# Patient Record
Sex: FEMALE | Race: WHITE | NOT HISPANIC OR LATINO | Employment: OTHER | URBAN - METROPOLITAN AREA
[De-identification: names, ages, dates, MRNs, and addresses within clinical notes are randomized per-mention and may not be internally consistent; named-entity substitution may affect disease eponyms.]

---

## 2017-09-20 PROBLEM — H66.92 OTITIS OF LEFT EAR: Status: ACTIVE | Noted: 2017-09-20

## 2017-09-20 PROBLEM — I44.7 LEFT BUNDLE BRANCH BLOCK (LBBB): Status: ACTIVE | Noted: 2017-09-20

## 2017-09-20 PROBLEM — R07.9 CHEST PAIN: Status: ACTIVE | Noted: 2017-09-20

## 2019-05-14 PROBLEM — S12.101D CLOSED NONDISPLACED FRACTURE OF SECOND CERVICAL VERTEBRA WITH ROUTINE HEALING: Status: ACTIVE | Noted: 2019-05-14

## 2019-11-14 PROBLEM — F41.9 ANXIETY: Status: ACTIVE | Noted: 2019-11-14

## 2019-11-14 PROBLEM — H66.92 OTITIS OF LEFT EAR: Status: RESOLVED | Noted: 2017-09-20 | Resolved: 2019-11-14

## 2019-11-14 PROBLEM — R07.9 CHEST PAIN: Status: RESOLVED | Noted: 2017-09-20 | Resolved: 2019-11-14

## 2021-06-09 PROBLEM — Z71.89 ACP (ADVANCE CARE PLANNING): Status: ACTIVE | Noted: 2021-06-09

## 2021-06-09 PROBLEM — R55 SYNCOPE: Status: ACTIVE | Noted: 2021-06-09

## 2022-08-08 PROBLEM — D35.2 PITUITARY MACROADENOMA: Status: ACTIVE | Noted: 2022-08-08

## 2022-08-08 PROBLEM — S14.109S: Status: ACTIVE | Noted: 2022-08-08

## 2023-01-04 ENCOUNTER — OFFICE VISIT (OUTPATIENT)
Dept: NEUROLOGY | Facility: CLINIC | Age: 78
End: 2023-01-04
Payer: MEDICARE

## 2023-01-04 ENCOUNTER — LAB VISIT (OUTPATIENT)
Dept: LAB | Facility: HOSPITAL | Age: 78
End: 2023-01-04
Payer: MEDICARE

## 2023-01-04 VITALS
SYSTOLIC BLOOD PRESSURE: 138 MMHG | DIASTOLIC BLOOD PRESSURE: 93 MMHG | WEIGHT: 142.88 LBS | HEART RATE: 77 BPM | RESPIRATION RATE: 18 BRPM | BODY MASS INDEX: 23.77 KG/M2

## 2023-01-04 DIAGNOSIS — D35.2 PITUITARY MACROADENOMA: ICD-10-CM

## 2023-01-04 DIAGNOSIS — R55 VASOVAGAL SYNCOPE: ICD-10-CM

## 2023-01-04 DIAGNOSIS — R55 SYNCOPE AND COLLAPSE: Primary | ICD-10-CM

## 2023-01-04 DIAGNOSIS — R42 DIZZINESS AND GIDDINESS: ICD-10-CM

## 2023-01-04 DIAGNOSIS — F41.9 ANXIETY: ICD-10-CM

## 2023-01-04 DIAGNOSIS — S14.109S INJURY OF CERVICAL SPINE, SEQUELA: ICD-10-CM

## 2023-01-04 DIAGNOSIS — R55 SYNCOPE AND COLLAPSE: ICD-10-CM

## 2023-01-04 LAB
CREAT SERPL-MCNC: 0.9 MG/DL (ref 0.5–1.4)
EST. GFR  (NO RACE VARIABLE): >60 ML/MIN/1.73 M^2

## 2023-01-04 PROCEDURE — 3080F DIAST BP >= 90 MM HG: CPT | Mod: CPTII,S$GLB,, | Performed by: NURSE PRACTITIONER

## 2023-01-04 PROCEDURE — 1126F PR PAIN SEVERITY QUANTIFIED, NO PAIN PRESENT: ICD-10-PCS | Mod: CPTII,S$GLB,, | Performed by: NURSE PRACTITIONER

## 2023-01-04 PROCEDURE — 99205 PR OFFICE/OUTPT VISIT, NEW, LEVL V, 60-74 MIN: ICD-10-PCS | Mod: S$GLB,,, | Performed by: NURSE PRACTITIONER

## 2023-01-04 PROCEDURE — 99999 PR PBB SHADOW E&M-EST. PATIENT-LVL IV: CPT | Mod: PBBFAC,,, | Performed by: NURSE PRACTITIONER

## 2023-01-04 PROCEDURE — 1159F MED LIST DOCD IN RCRD: CPT | Mod: CPTII,S$GLB,, | Performed by: NURSE PRACTITIONER

## 2023-01-04 PROCEDURE — 3075F SYST BP GE 130 - 139MM HG: CPT | Mod: CPTII,S$GLB,, | Performed by: NURSE PRACTITIONER

## 2023-01-04 PROCEDURE — 1126F AMNT PAIN NOTED NONE PRSNT: CPT | Mod: CPTII,S$GLB,, | Performed by: NURSE PRACTITIONER

## 2023-01-04 PROCEDURE — 3288F PR FALLS RISK ASSESSMENT DOCUMENTED: ICD-10-PCS | Mod: CPTII,S$GLB,, | Performed by: NURSE PRACTITIONER

## 2023-01-04 PROCEDURE — 1101F PR PT FALLS ASSESS DOC 0-1 FALLS W/OUT INJ PAST YR: ICD-10-PCS | Mod: CPTII,S$GLB,, | Performed by: NURSE PRACTITIONER

## 2023-01-04 PROCEDURE — 82565 ASSAY OF CREATININE: CPT | Performed by: NURSE PRACTITIONER

## 2023-01-04 PROCEDURE — 1101F PT FALLS ASSESS-DOCD LE1/YR: CPT | Mod: CPTII,S$GLB,, | Performed by: NURSE PRACTITIONER

## 2023-01-04 PROCEDURE — 99205 OFFICE O/P NEW HI 60 MIN: CPT | Mod: S$GLB,,, | Performed by: NURSE PRACTITIONER

## 2023-01-04 PROCEDURE — 3075F PR MOST RECENT SYSTOLIC BLOOD PRESS GE 130-139MM HG: ICD-10-PCS | Mod: CPTII,S$GLB,, | Performed by: NURSE PRACTITIONER

## 2023-01-04 PROCEDURE — 99999 PR PBB SHADOW E&M-EST. PATIENT-LVL IV: ICD-10-PCS | Mod: PBBFAC,,, | Performed by: NURSE PRACTITIONER

## 2023-01-04 PROCEDURE — 36415 COLL VENOUS BLD VENIPUNCTURE: CPT | Mod: PO | Performed by: NURSE PRACTITIONER

## 2023-01-04 PROCEDURE — 3288F FALL RISK ASSESSMENT DOCD: CPT | Mod: CPTII,S$GLB,, | Performed by: NURSE PRACTITIONER

## 2023-01-04 PROCEDURE — 1159F PR MEDICATION LIST DOCUMENTED IN MEDICAL RECORD: ICD-10-PCS | Mod: CPTII,S$GLB,, | Performed by: NURSE PRACTITIONER

## 2023-01-04 PROCEDURE — 3080F PR MOST RECENT DIASTOLIC BLOOD PRESSURE >= 90 MM HG: ICD-10-PCS | Mod: CPTII,S$GLB,, | Performed by: NURSE PRACTITIONER

## 2023-01-04 NOTE — ASSESSMENT & PLAN NOTE
Diagnostic testing to date reviewed:  + tilt test with drop in BP from 145/76 to 113/78 WITHOUT change in HR; pt reportedly asymptomatic throughout  30 day event monitor w/out significant arrhythmia, rather infrequent PVCs  MRI pituitary from 12/2021 with stability, no other intracranial abnormalities other than known adenoma  CTA H&N from 2018 with patent VB system, no significant LV disease   She is not orthostatic in clinic today    Continue conservative efforts to combat orthostasis   Given more recent spells described, will evaluate for possible epileptic events although suspicion for this is relatively low.   Update vascular and brain imaging. Routine EEG, followed by ambulatory 3 day EEG   No indication for AED at this time. Cautioned against driving until further evaluation of these spells.

## 2023-01-04 NOTE — PATIENT INSTRUCTIONS
Plan:  MRI of the brain  CTA (scan of the blood vessels in the head and neck) of the head and neck  EEG -- seizure test -- have to start with 30 min test, then will proceed with 2-3 day test    Continue hydration as we discussed to prevent drops in BP when you change position   - limit caffeine, as this can dehydrate you   - increase salt intake   - eating small, frequent meals throughout the day can also help with BP issie    - aside from water, can also try things like pedialyte, gatorade or body armour  Take your time changing positions    Follow up after testing complete -- around 8 weeks

## 2023-01-04 NOTE — PROGRESS NOTES
NEUROLOGY  Outpatient Consultation Visit     [facility]  [address] [facility] [address], [place] [address]  [phone] (office) / [phone] (fax)    Patient Name:  Lainey Blanco  :  1945  MR #:  [id number]  Acct #:  [id number]    Date of  Visit: 2023    Other Physicians:  Millie Camarena MD (Primary Care Physician)      CHIEF COMPLAINT: Dizziness and Loss of Consciousness (/)      HISTORY OF PRESENT ILLNESS 23:  Lainey Blanco is a 77 y.o. R-handed female seen in consultation for dizziness and syncope per Millie Camarena MD    Medical history is significant for non obstructive CAD, palpitations, pituitary adenoma, anxiety, HLD, osteoporosis     Onset:   ~3 years ago    Description:  Initial episode that occurred in restroom. She stood up from the toilet and passed out without warning. She was admitted to the ICU in [place] afterwards, sustained a C spine fracture. She was told that this was a vagal episode and also r/t dehydration.     Had similar episode in 2021, when she stood up from squatting and then woke up on the floor. Again, no prodrome. Was admitted to [facility] with this. She sustained injury to the L eye and had transient diplopia afterwards.     After 2nd episode, had a tilt test, stress test and event monitor per cardiology. The tilt test was positive, but she doesn't recall any specific interventions that were recommended. She has not been on any prescription Rx in several years. She feels that she is well hydrated; drinks 4-6 bottles of water per day.    Now very cautious to take her time with position change. Sometimes still has momentary dizziness with position change despite these measures.      She has a history of palpitations that feel like a fluttering sensation in her stomach. With this, she feels like she may faint. These have mostly resolved, though.     About 6 months ago, began to note a different sensation than episodes above. This sensation occurs  "without regard to position change (at rest, has awoken her from sleep). She feels it coming on, as if she is "entering another world" or "going into a black hole." Describes initial episode that occurred at home. She was standing, holding remote control and it fell to the floor. She was able to sit down.  came to check on her and she swore that she was talking to him, but he said that she was silent with a staring look on her face. Lasts for minutes. Has an episode every other day on average. She feels very tired after an episode, sometimes for the entire day.     Sensations have made her very anxious.     No HA, nausea, vomiting, vision change, paresthesia or focal weakness. Does feel like she has a sensation of pressure in her head after an episode.     Has known pituitary tumor and follows with endocrinology. This has been stable for years. Last imaging over 1 year ago.     Has chronic tinnitus and has seen ENT in the past. Diagnosed with BPPV years ago when living in [place], but this is different.     Doesn't sleep well. Up late and typically wakes ~ 1 hour after falling asleep. Takes ashwagandha to help with this. Has been on this for a couple of months. Episodes started prior to taking this.     Has never been evaluated by neurology. No personal or FH of seizures. No CNS infection. Not aware of any  complications.     Allergies:  Review of patient's allergies indicates:   Allergen Reactions    Darvon [propoxyphene] Hives       Current Medications:  Current Outpatient Medications   Medication Sig Dispense Refill    ASHWAGANDHA ROOT EXTRACT ORAL Take 1 tablet by mouth every evening.      b complex vitamins capsule Take 1 capsule by mouth once daily.      multivit-minerals/folic acid (WOMEN'S MULTIVITAMIN GUMMIES ORAL) Take by mouth.      niacinamide 500 mg Tab Take 1 tablet by mouth once daily.       No current facility-administered medications for this visit.       Past Medical History:  Past " Medical History:   Diagnosis Date    Arthritis     Dermatitis     Dyslipidemia     Epigastric pain     Headache     History of chicken pox     Osteoporosis        Past Surgical History:  Past Surgical History:   Procedure Laterality Date    basal cell carcinoma removal  06/2018    from forehead-pat reported    CATARACT EXTRACTION      right 5- 2015, left 07/15/2015    COLONOSCOPY  08/16/2021    Dr. Hamilton    ESOPHAGOGASTRODUODENOSCOPY  08/12/2014    OTHER SURGICAL HISTORY      1.   Elevate anterior repair. 2.   Transobturator suburethral sling placement.    steroid injection      left hip-pat reported    TUBAL LIGATION      UPPER GI ENDOSCOPY  10/09/2017       Family History:  family history includes Asthma in her mother; Breast cancer in her maternal aunt; Cancer in her father and mother; Diabetes in her maternal aunt and maternal grandmother; Heart attack in her maternal grandfather; Heart failure in her maternal grandfather; Other in her maternal grandfather.    Social History:   reports that she has never smoked. She has never used smokeless tobacco. She reports current alcohol use. She reports that she does not use drugs.      REVIEW OF SYSTEMS:  As per HPI    PHYSICAL EXAM:  BP (!) 138/93 (BP Location: Right arm, Patient Position: Standing, BP Method: Medium (Automatic))   Pulse 77   Resp 18   Wt 64.8 kg (142 lb 13.7 oz)   BMI 23.77 kg/m²     General: Well groomed. No acute distress.  Cardiovascular: Regular rate and rhythm.   Pulmonary: Normal effort and rate.   Musculoskeletal: No obvious joint deformities, moves all extremities well.  Extremities: No clubbing, cyanosis or edema.     Neurological exam:  Mental status: Awake and alert.  Oriented to person, place, time and situation. Recent and remote memory appear to be intact.  Fund of knowledge normal. Normal attention and concentration.   Speech/Language: Fluent and appropriate. No dysarthria or aphasia on conversation. Able to follow complex  "commands.   Cranial nerves (II-XII): Visual fields full. Pupils equal round and reactive to light, extraocular movements intact, no ptosis, no nystagmus. Facial sensation and symmetry intact bilaterally. Hearing grossly intact. Palate mobile and midline. Shoulder shrug normal bilaterally. Normal tongue protrusion.   Motor: 5 out of 5 strength throughout the upper and lower extremities bilaterally. Normal bulk and tone. No abnormal movements noted. No drift appreciated.   Sensation: Intact to light touch, vibration and temperature bilaterally.   DTR: 2+ at the knees and biceps bilaterally.  Coordination: Finger-nose-finger testing intact bilaterally. No tremor. Romberg absent.   Gait: Normal gait, including heel, toe walk. Mild difficulty with tandem.     DIAGNOSTIC DATA:  I have personally reviewed provider notes, labs and imaging made available to me today.     Admitted to [facility] 11/2018 after syncopal event. Sustained C2 fracture, rib fractures and pneumothorax. Orthostatic BP were normal. Telemetry and TTE were normal.     She is established with [facility] cardiology. Last OV note from 8/2022 indicates history of positive Tilt test with "significant change in blood pressure but no associated symptoms" in 2021. She also had a 30 day event monitor that showed only PVCs but deferred medication for this.     Imaging:  [facility] 12/2/22:  Impression:  1. No acute intracranial abnormalities identified.     MRI pituitary w wo 12/15/21:  FINDINGS:  Sella: There is again a approximately 1.3 x 0.8 x 0.8 cm intrasellar mass slightly greater on the right compared to the left with upward convexity in extension into the suprasellar cistern.  Pituitary stalk is deviated to the left.  No lateral extension into the cavernous sinuses.  This lesion remains stable and unchanged when compared the previous study.  There is normal enhancement of the left lateral aspect of the pituitary gland.  Flow void of the cavernous internal " carotid arteries remains within normal limits.  No displacement or compression of the optic chiasm or the floor of the 3rd ventricle.     Remainder of Intracranial Compartment (limited evaluation):     There is age-appropriate generalized cerebral volume loss.  In the supratentorial cerebral hemispheres no significant abnormal signal intensity lesions noted.  No signs for intracranial hemorrhages or abnormal extra-axial fluid collections or midline shift or herniations or infarctions.  There is no restricted diffusion.     Cerebellar hemispheres are unremarkable.  The flow void of the major vessels is within normal limits.  Visualized paranasal air sinuses are clear.  Hypoplasia of the left maxillary sinus.     Impression:  1. 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    CUS 6/2021:  Impression:  No evidence of a hemodynamically significant carotid bifurcation stenosis.    CTA head & neck 11/2018:  CT angiogram neck:     Normal three-vessel aortic arch. Left subclavian artery patent. Right subclavian artery patent. Innominate artery patent. Right common carotid artery patent. Right cervical internal carotid artery patent. Left cervical internal carotid artery patent. Left common carotid artery patent. Left vertebral artery patent. Right vertebral artery patent.     CT angiogram head: Basilar artery patent. Posterior cerebral arteries are patent. Intracranial internal carotid arteries demonstrate mild atherosclerotic change without stenosis. Right MCA patent. Bilateral anterior cerebral arteries are patent. Left MCA patent. Major dural venous sinuses are patent.    Cardiac:  EKG 8/8/22:  Normal sinus rhythm   Left bundle-branch block     Tilt Test 6/2021:  Positive tilt-table test with significant decrease in blood pressure from a baseline of 145/76 to a low 113/78 with tilted to 60°.  There was no significant change in heart rate and the patient was asymptomatic throughout the  test    Labs:  Lab Results   Component Value Date    WBC 5.81 12/02/2022    HGB 13.7 12/02/2022    HCT 42.6 12/02/2022     12/02/2022    MCV 90 12/02/2022    RDW 12.8 12/02/2022     Lab Results   Component Value Date     08/16/2022    K 4.5 08/16/2022     08/16/2022    CO2 29 08/16/2022    BUN 13 08/16/2022    CREATININE 0.88 08/16/2022    GLU 90 08/16/2022    CALCIUM 9.4 08/16/2022    MG 2.2 06/09/2021    PHOS 4.1 07/17/2019     Lab Results   Component Value Date    PROT 6.9 08/16/2022    ALBUMIN 4.1 08/16/2022    BILITOT 0.9 08/16/2022    AST 31 08/16/2022    ALKPHOS 89 08/16/2022    ALT 17 08/16/2022     No results found for: INR, PROTIME, PTT  Lab Results   Component Value Date    CHOL 260 (H) 08/16/2022    HDL 94 (H) 08/16/2022    LDLCALC 134.0 08/16/2022    TRIG 160 (H) 08/16/2022    CHOLHDL 36.2 08/16/2022     No results found for: LABA1C, HGBA1C   Lab Results   Component Value Date    [id number] 791 12/02/2022     No results found for: FOLATE  Lab Results   Component Value Date    TSH 1.170 12/02/2022       ASSESSMENT & PLAN:  Lainey Blanco is a 77 y.o. R-handed female seen in consultation for dizziness and syncope.     Problem List Items Addressed This Visit          Psychiatric    Anxiety       Endocrine    Pituitary macroadenoma       Other    Syncope    Overview     Onset 2018 with recurrence in 2021         Current Assessment & Plan     Diagnostic testing to date reviewed:  + tilt test with drop in BP from 145/76 to 113/78 WITHOUT change in HR; pt reportedly asymptomatic throughout  30 day event monitor w/out significant arrhythmia, rather infrequent PVCs  MRI pituitary from 12/2021 with stability, no other intracranial abnormalities other than known adenoma  CTA H&N from 2018 with patent VB system, no significant LV disease   She is not orthostatic in clinic today    Continue conservative efforts to combat orthostasis   Given more recent spells described, will evaluate for possible  epileptic events although suspicion for this is relatively low.   Update vascular and brain imaging. Routine EEG, followed by ambulatory 3 day EEG   No indication for AED at this time. Cautioned against driving until further evaluation of these spells.            Injury of cervical spine, sequela    Current Assessment & Plan     Update vascular imaging as above           Other Visit Diagnoses       Syncope and collapse    -  Primary    Dizziness and giddiness                Follow up: after travel / testing ~ 8 weeks     I spent a total of 64 minutes on the day of the visit.    This includes face to face time with the patient, as well as non-face to face time preparing for and completing the visit (review of prior diagnostic testing and clinical notes, obtaining or reviewing history, documenting clinical information in the EMR, independently interpreting and communicating results to the patient/family and coordinating ongoing care).       I appreciate the opportunity to participate in the care of this patient. Please feel free to contact me with any concerns or questions.       Veronica Spaulding, ACNPC-AG  [facility] [place]  [address] [place] [address]

## 2023-01-18 ENCOUNTER — HOSPITAL ENCOUNTER (OUTPATIENT)
Dept: RADIOLOGY | Facility: HOSPITAL | Age: 78
Discharge: HOME | End: 2023-01-18
Attending: NURSE PRACTITIONER
Payer: MEDICARE

## 2023-01-18 DIAGNOSIS — R42 DIZZINESS AND GIDDINESS: ICD-10-CM

## 2023-01-18 DIAGNOSIS — R55 SYNCOPE AND COLLAPSE: ICD-10-CM

## 2023-01-18 PROCEDURE — A9585 GADOBUTROL INJECTION: HCPCS | Mod: PO | Performed by: NURSE PRACTITIONER

## 2023-01-18 PROCEDURE — 70498 CTA HEAD AND NECK (XPD): ICD-10-PCS | Mod: 26,,, | Performed by: RADIOLOGY

## 2023-01-18 PROCEDURE — 70496 CT ANGIOGRAPHY HEAD: CPT | Mod: 26,,, | Performed by: RADIOLOGY

## 2023-01-18 PROCEDURE — 25500020 PHARM REV CODE 255: Mod: PO | Performed by: NURSE PRACTITIONER

## 2023-01-18 PROCEDURE — 70553 MRI BRAIN STEM W/O & W/DYE: CPT | Mod: TC,PO

## 2023-01-18 PROCEDURE — 70553 MRI BRAIN W WO CONTRAST: ICD-10-PCS | Mod: 26,,, | Performed by: RADIOLOGY

## 2023-01-18 PROCEDURE — 70553 MRI BRAIN STEM W/O & W/DYE: CPT | Mod: 26,,, | Performed by: RADIOLOGY

## 2023-01-18 PROCEDURE — 70498 CT ANGIOGRAPHY NECK: CPT | Mod: 26,,, | Performed by: RADIOLOGY

## 2023-01-18 PROCEDURE — 70496 CTA HEAD AND NECK (XPD): ICD-10-PCS | Mod: 26,,, | Performed by: RADIOLOGY

## 2023-01-18 PROCEDURE — 70496 CT ANGIOGRAPHY HEAD: CPT | Mod: TC,PO

## 2023-01-18 RX ORDER — GADOBUTROL 604.72 MG/ML
6 INJECTION INTRAVENOUS
Status: COMPLETED | OUTPATIENT
Start: 2023-01-18 | End: 2023-01-18

## 2023-01-18 RX ADMIN — IOHEXOL 100 ML: 350 INJECTION, SOLUTION INTRAVENOUS at 02:01

## 2023-01-18 RX ADMIN — GADOBUTROL 6 ML: 604.72 INJECTION INTRAVENOUS at 02:01

## 2023-01-21 ENCOUNTER — PATIENT MESSAGE (OUTPATIENT)
Dept: NEUROLOGY | Facility: CLINIC | Age: 78
End: 2023-01-21
Payer: MEDICARE

## 2023-01-23 ENCOUNTER — PATIENT MESSAGE (OUTPATIENT)
Dept: NEUROLOGY | Facility: CLINIC | Age: 78
End: 2023-01-23
Payer: MEDICARE

## 2023-02-02 ENCOUNTER — PATIENT MESSAGE (OUTPATIENT)
Dept: NEUROLOGY | Facility: CLINIC | Age: 78
End: 2023-02-02
Payer: MEDICARE

## 2023-02-16 ENCOUNTER — PATIENT MESSAGE (OUTPATIENT)
Dept: NEUROLOGY | Facility: CLINIC | Age: 78
End: 2023-02-16
Payer: MEDICARE

## 2023-02-20 PROBLEM — R07.2 PRECORDIAL PAIN: Status: ACTIVE | Noted: 2023-02-20

## 2023-03-28 ENCOUNTER — OFFICE VISIT (OUTPATIENT)
Dept: NEUROLOGY | Facility: CLINIC | Age: 78
End: 2023-03-28
Payer: MEDICARE

## 2023-03-28 VITALS
DIASTOLIC BLOOD PRESSURE: 93 MMHG | RESPIRATION RATE: 16 BRPM | BODY MASS INDEX: 24.19 KG/M2 | WEIGHT: 145.19 LBS | HEIGHT: 65 IN | HEART RATE: 67 BPM | SYSTOLIC BLOOD PRESSURE: 134 MMHG

## 2023-03-28 DIAGNOSIS — R55 VASOVAGAL SYNCOPE: Primary | ICD-10-CM

## 2023-03-28 PROCEDURE — 1159F MED LIST DOCD IN RCRD: CPT | Mod: CPTII,S$GLB,, | Performed by: NURSE PRACTITIONER

## 2023-03-28 PROCEDURE — 1100F PR PT FALLS ASSESS DOC 2+ FALLS/FALL W/INJURY/YR: ICD-10-PCS | Mod: CPTII,S$GLB,, | Performed by: NURSE PRACTITIONER

## 2023-03-28 PROCEDURE — 99999 PR PBB SHADOW E&M-EST. PATIENT-LVL III: CPT | Mod: PBBFAC,,, | Performed by: NURSE PRACTITIONER

## 2023-03-28 PROCEDURE — 1126F PR PAIN SEVERITY QUANTIFIED, NO PAIN PRESENT: ICD-10-PCS | Mod: CPTII,S$GLB,, | Performed by: NURSE PRACTITIONER

## 2023-03-28 PROCEDURE — 99999 PR PBB SHADOW E&M-EST. PATIENT-LVL III: ICD-10-PCS | Mod: PBBFAC,,, | Performed by: NURSE PRACTITIONER

## 2023-03-28 PROCEDURE — 3080F DIAST BP >= 90 MM HG: CPT | Mod: CPTII,S$GLB,, | Performed by: NURSE PRACTITIONER

## 2023-03-28 PROCEDURE — 99213 PR OFFICE/OUTPT VISIT, EST, LEVL III, 20-29 MIN: ICD-10-PCS | Mod: S$GLB,,, | Performed by: NURSE PRACTITIONER

## 2023-03-28 PROCEDURE — 1159F PR MEDICATION LIST DOCUMENTED IN MEDICAL RECORD: ICD-10-PCS | Mod: CPTII,S$GLB,, | Performed by: NURSE PRACTITIONER

## 2023-03-28 PROCEDURE — 1100F PTFALLS ASSESS-DOCD GE2>/YR: CPT | Mod: CPTII,S$GLB,, | Performed by: NURSE PRACTITIONER

## 2023-03-28 PROCEDURE — 3288F FALL RISK ASSESSMENT DOCD: CPT | Mod: CPTII,S$GLB,, | Performed by: NURSE PRACTITIONER

## 2023-03-28 PROCEDURE — 3075F PR MOST RECENT SYSTOLIC BLOOD PRESS GE 130-139MM HG: ICD-10-PCS | Mod: CPTII,S$GLB,, | Performed by: NURSE PRACTITIONER

## 2023-03-28 PROCEDURE — 3288F PR FALLS RISK ASSESSMENT DOCUMENTED: ICD-10-PCS | Mod: CPTII,S$GLB,, | Performed by: NURSE PRACTITIONER

## 2023-03-28 PROCEDURE — 3080F PR MOST RECENT DIASTOLIC BLOOD PRESSURE >= 90 MM HG: ICD-10-PCS | Mod: CPTII,S$GLB,, | Performed by: NURSE PRACTITIONER

## 2023-03-28 PROCEDURE — 1126F AMNT PAIN NOTED NONE PRSNT: CPT | Mod: CPTII,S$GLB,, | Performed by: NURSE PRACTITIONER

## 2023-03-28 PROCEDURE — 3075F SYST BP GE 130 - 139MM HG: CPT | Mod: CPTII,S$GLB,, | Performed by: NURSE PRACTITIONER

## 2023-03-28 PROCEDURE — 99213 OFFICE O/P EST LOW 20 MIN: CPT | Mod: S$GLB,,, | Performed by: NURSE PRACTITIONER

## 2023-03-28 NOTE — PATIENT INSTRUCTIONS
Continue efforts to stay well hydrated to prevent the drops in your blood pressure with position change    Follow up as planned with Dr. Teague     You can follow up here as needed. Please feel free to call or message me anytime with questions or concerns.

## 2023-03-28 NOTE — PROGRESS NOTES
NEUROLOGY  Outpatient Follow Up Visit     [facility]  [address] [facility], [place] [address]  [phone] (office) / [phone] (fax)    Patient Name:  Lainey Blanco  :  1945  MR #:  [id number]  Acct #:  [id number]    Date of  Visit: 2023    Other Physicians:  Millie Camarena MD (Primary Care Physician)      CHIEF COMPLAINT: Dizziness (Follow up)      Interval history:  3/28/23:  Lainey Blanco is a 77 y.o. R-handed female seen in f/u for dizziness and syncope     Medical history is significant for non obstructive CAD, palpitations, pituitary adenoma, anxiety, HLD, osteoporosis     Updated MRI brain and vascular imaging was negative. Routine EEG in February didn't show any epileptic discharges.     She had an angiogram and repeat Tilt test. The Tilt test showed a significant drop in BP after NTG. She continues to work on staying well hydrated. She is using Liquid IV in her water. Her angiogram showed mild non obstructive CAD. She is wearing a cardiac monitor at present.     She hasn't had any further syncopal episodes. She has some dizzy spells, but they are less frequent and severe. Her anxiety is also improved, which she thinks has helped her symptoms.     She did f/u with her ENT (Shreyas) and was told that she didn't have BPPV. He did recommend some balance therapy, but she didn't have this done.     HISTORY OF PRESENT ILLNESS 23:  Lainey Blanco is a 77 y.o. R-handed female seen in consultation for dizziness and syncope per No ref. provider found    Medical history is significant for non obstructive CAD, palpitations, pituitary adenoma, anxiety, HLD, osteoporosis     Onset:   ~3 years ago    Description:  Initial episode that occurred in restroom. She stood up from the toilet and passed out without warning. She was admitted to the ICU in [place] afterwards, sustained a C spine fracture. She was told that this was a vagal episode and also r/t dehydration.     Had  "similar episode in June 2021, when she stood up from squatting and then woke up on the floor. Again, no prodrome. Was admitted to [facility] with this. She sustained injury to the L eye and had transient diplopia afterwards.     After 2nd episode, had a tilt test, stress test and event monitor per cardiology. The tilt test was positive, but she doesn't recall any specific interventions that were recommended. She has not been on any prescription Rx in several years. She feels that she is well hydrated; drinks 4-6 bottles of water per day.    Now very cautious to take her time with position change. Sometimes still has momentary dizziness with position change despite these measures.      She has a history of palpitations that feel like a fluttering sensation in her stomach. With this, she feels like she may faint. These have mostly resolved, though.     About 6 months ago, began to note a different sensation than episodes above. This sensation occurs without regard to position change (at rest, has awoken her from sleep). She feels it coming on, as if she is "entering another world" or "going into a black hole." Describes initial episode that occurred at home. She was standing, holding remote control and it fell to the floor. She was able to sit down.  came to check on her and she swore that she was talking to him, but he said that she was silent with a staring look on her face. Lasts for minutes. Has an episode every other day on average. She feels very tired after an episode, sometimes for the entire day.     Sensations have made her very anxious.     No HA, nausea, vomiting, vision change, paresthesia or focal weakness. Does feel like she has a sensation of pressure in her head after an episode.     Has known pituitary tumor and follows with endocrinology. This has been stable for years. Last imaging over 1 year ago.     Has chronic tinnitus and has seen ENT in the past. Diagnosed with BPPV years ago when living in " [place], but this is different.     Doesn't sleep well. Up late and typically wakes ~ 1 hour after falling asleep. Takes ashwagandha to help with this. Has been on this for a couple of months. Episodes started prior to taking this.     Has never been evaluated by neurology. No personal or FH of seizures. No CNS infection. Not aware of any  complications.     Allergies:  Review of patient's allergies indicates:   Allergen Reactions    Darvon [propoxyphene] Hives       Current Medications:  Current Outpatient Medications   Medication Sig Dispense Refill    ASHWAGANDHA ROOT EXTRACT ORAL Take 1 tablet by mouth every evening.      b complex vitamins capsule Take 1 capsule by mouth once daily.      multivit-minerals/folic acid (WOMEN'S MULTIVITAMIN GUMMIES ORAL) Take by mouth.      niacinamide 500 mg Tab Take 1 tablet by mouth once daily.       Current Facility-Administered Medications   Medication Dose Route Frequency Provider Last Rate Last Admin    nitroGLYCERIN SL tablet 0.4 mg  0.4 mg Sublingual Once Jones Teague MD           Past Medical History:  Past Medical History:   Diagnosis Date    Arthritis     Cancer     skin cancer to leg/forehead    Dermatitis     Dyslipidemia     Epigastric pain     Headache     History of chicken pox     Osteoporosis        Past Surgical History:  Past Surgical History:   Procedure Laterality Date    basal cell carcinoma removal  2018    from forehead-pat reported    CATARACT EXTRACTION      right 2015, left 07/15/2015    COLONOSCOPY  2021    Dr. Hamilton    CORONARY ANGIOGRAPHY N/A 2023    Procedure: ANGIOGRAM, CORONARY ARTERY;  Surgeon: Jones Teague MD;  Location: [facility] CATH;  Service: Cardiology;  Laterality: N/A;    ESOPHAGOGASTRODUODENOSCOPY  2014    LEFT HEART CATHETERIZATION Left 2023    Procedure: Left heart cath;  Surgeon: Jones Teague MD;  Location: [facility] CATH;  Service: Cardiology;  Laterality: Left;    OTHER SURGICAL HISTORY      1.    "Elevate anterior repair. 2.   Transobturator suburethral sling placement.    steroid injection      left hip-pat reported    TUBAL LIGATION      UPPER GI ENDOSCOPY  10/09/2017       Family History:  family history includes Asthma in her mother; Breast cancer in her maternal aunt; Cancer in her father and mother; Diabetes in her maternal aunt and maternal grandmother; Heart attack in her maternal grandfather; Heart failure in her maternal grandfather; Other in her maternal grandfather.    Social History:   reports that she has never smoked. She has never used smokeless tobacco. She reports current alcohol use. She reports that she does not use drugs.      REVIEW OF SYSTEMS:  As per HPI    PHYSICAL EXAM:  BP (!) 134/93 (BP Location: Left arm, Patient Position: Sitting, BP Method: Medium (Automatic))   Pulse 67   Resp 16   Ht 5' 5" (1.651 m)   Wt 65.9 kg (145 lb 2.8 oz)   BMI 24.16 kg/m²     General: Well groomed. No acute distress.  Pulmonary: Normal effort and rate.   Musculoskeletal: No obvious joint deformities, moves all extremities well.  Extremities: No clubbing, cyanosis or edema.     Neurological exam:  Mental status: Awake and alert.  Oriented to person, place, time and situation.    Speech/Language: Fluent and appropriate. No dysarthria or aphasia on conversation.   Cranial nerves (II-XII): Visual fields full. Extraocular movements intact, no ptosis, no nystagmus. Facial sensation and symmetry intact bilaterally. Hearing grossly intact. Palate and tongue deferred. Shoulder shrug normal bilaterally.    Motor: 5 out of 5 strength throughout the upper and lower extremities bilaterally.   Sensation: Intact to light touch.  DTR: 2+ at the knees and biceps bilaterally.  Coordination: Finger-nose-finger testing intact bilaterally. No tremor.   Gait: Normal gait    DIAGNOSTIC DATA:  I have personally reviewed provider notes, labs and imaging made available to me today.     Admitted to [facility] " "11/2018 after syncopal event. Sustained C2 fracture, rib fractures and pneumothorax. Orthostatic BP were normal. Telemetry and TTE were normal.     She is established with [facility] [place] cardiology. Last OV note from 8/2022 indicates history of positive Tilt test with "significant change in blood pressure but no associated symptoms" in 2021. She also had a 30 day event monitor that showed only PVCs but deferred medication for this.     Imaging:  CTA head and neck 1/18/23:  Impression:  1. No evidence of acute intracranial abnormality.  2. No intracranial high-grade stenosis, large vessel occlusion or aneurysm.  3. No extracranial high-grade stenosis, major branch occlusion, dissection or aneurysm.    MRI brain w wo 1/18/23:  Impression:  1. No discrete mass along the IACs including no schwannoma.  2. Unchanged size of the known pituitary lesion favoring a macro adenoma.  3. Right mastoid effusion.  4. Mild cerebral involutional change and minimal white matter disease.     MRI pituitary w wo 12/15/21:  FINDINGS:  Sella: There is again a approximately 1.3 x 0.8 x 0.8 cm intrasellar mass slightly greater on the right compared to the left with upward convexity in extension into the suprasellar cistern.  Pituitary stalk is deviated to the left.  No lateral extension into the cavernous sinuses.  This lesion remains stable and unchanged when compared the previous study.  There is normal enhancement of the left lateral aspect of the pituitary gland.  Flow void of the cavernous internal carotid arteries remains within normal limits.  No displacement or compression of the optic chiasm or the floor of the 3rd ventricle.     Remainder of Intracranial Compartment (limited evaluation):     There is age-appropriate generalized cerebral volume loss.  In the supratentorial cerebral hemispheres no significant abnormal signal intensity lesions noted.  No signs for intracranial hemorrhages or abnormal extra-axial fluid collections or midline " shift or herniations or infarctions.  There is no restricted diffusion.     Cerebellar hemispheres are unremarkable.  The flow void of the major vessels is within normal limits.  Visualized paranasal air sinuses are clear.  Hypoplasia of the left maxillary sinus.     Impression:  1. 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      Cardiac:  TTE 3/2023:  The left ventricle is normal in size with mild concentric hypertrophy and normal systolic function.  The estimated ejection fraction is 55%.  Grade I left ventricular diastolic dysfunction.  Normal right ventricular size with normal right ventricular systolic function.  Mild aortic regurgitation.  Mild mitral regurgitation.  Mild tricuspid regurgitation.     Tilt Test 2/2023:  Positive tilt table test with a significant decrease in blood pressure from 127/73 down to 88/62 after receiving nitroglycerin.    Tilt Test 6/2021:  Positive tilt-table test with significant decrease in blood pressure from a baseline of 145/76 to a low 113/78 with tilted to 60°.  There was no significant change in heart rate and the patient was asymptomatic throughout the test    Labs:  Lab Results   Component Value Date    WBC 6.17 02/20/2023    HGB 12.6 02/20/2023    HCT 40.2 02/20/2023     02/20/2023    MCV 89 02/20/2023    RDW 13.3 02/20/2023     Lab Results   Component Value Date     02/20/2023    K 4.4 02/20/2023     02/20/2023    CO2 30 02/20/2023    BUN 15 02/20/2023    CREATININE 0.76 02/20/2023    GLU 95 02/20/2023    CALCIUM 8.7 02/20/2023    MG 2.2 06/09/2021    PHOS 4.1 07/17/2019     Lab Results   Component Value Date    PROT 6.7 02/20/2023    ALBUMIN 4.0 02/20/2023    BILITOT 0.3 02/20/2023    AST 34 02/20/2023    ALKPHOS 81 02/20/2023    ALT 21 02/20/2023     No results found for: INR, PROTIME, PTT  Lab Results   Component Value Date    CHOL 260 (H) 08/16/2022    HDL 94 (H) 08/16/2022    LDLCALC 134.0 08/16/2022    TRIG 160  (H) 08/16/2022    CHOLHDL 36.2 08/16/2022     No results found for: LABA1C, HGBA1C   Lab Results   Component Value Date    [id number] 791 12/02/2022     No results found for: FOLATE  Lab Results   Component Value Date    TSH 1.170 12/02/2022       ASSESSMENT & PLAN:  Lainey Blanco is a 77 y.o. R-handed female seen in f/u for dizziness and syncope.     Problem List Items Addressed This Visit          Other    Syncope - Primary    Overview     Onset 2018 with recurrence in 2021  + Tilt x 2          Current Assessment & Plan     Reviewed diagnostic testing to date:   - MRI brain shows stability of pituitary macroadenoma, no other intracranial abnormality   - CTA H&N without LVO   - EEG negative     Most likely etiology of symptoms is orthostasis, which was again documented on recent repeat Tilt test  Reviewed conservative measures to combat this                 Follow up: PRN    I spent a total of 20 minutes on the day of the visit.    This includes face to face time with the patient, as well as non-face to face time preparing for and completing the visit (review of prior diagnostic testing and clinical notes, obtaining or reviewing history, documenting clinical information in the EMR, independently interpreting and communicating results to the patient/family and coordinating ongoing care).       I appreciate the opportunity to participate in the care of this patient. Please feel free to contact me with any concerns or questions.       Veronica Spaulding, [facility]  [facility]  [address] [place] [address]

## 2023-03-28 NOTE — ASSESSMENT & PLAN NOTE
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

## 2023-04-03 ENCOUNTER — PATIENT MESSAGE (OUTPATIENT)
Dept: NEUROLOGY | Facility: CLINIC | Age: 78
End: 2023-04-03
Payer: MEDICARE

## 2023-06-26 ENCOUNTER — PATIENT MESSAGE (OUTPATIENT)
Dept: NEUROLOGY | Facility: CLINIC | Age: 78
End: 2023-06-26
Payer: MEDICARE

## 2023-09-19 PROBLEM — I45.9 HEART BLOCK: Status: ACTIVE | Noted: 2023-09-19

## 2023-10-17 PROBLEM — Z95.0 CARDIAC PACEMAKER IN SITU: Status: ACTIVE | Noted: 2023-10-17

## 2024-08-22 PROBLEM — S14.109S: Status: RESOLVED | Noted: 2022-08-08 | Resolved: 2024-08-22

## 2024-11-01 ENCOUNTER — TELEPHONE (OUTPATIENT)
Dept: HEMATOLOGY/ONCOLOGY | Facility: CLINIC | Age: 79
End: 2024-11-01
Payer: MEDICARE

## 2024-11-29 PROBLEM — R07.9 CHEST PAIN: Status: ACTIVE | Noted: 2024-11-29

## 2025-01-16 DIAGNOSIS — G89.18 POST-OP PAIN: Primary | ICD-10-CM

## 2025-02-28 PROBLEM — I44.2 ATRIOVENTRICULAR BLOCK, COMPLETE: Status: ACTIVE | Noted: 2025-02-28

## 2025-07-07 ENCOUNTER — HOSPITAL ENCOUNTER (OUTPATIENT)
Dept: RADIOLOGY | Facility: HOSPITAL | Age: 80
Discharge: HOME | End: 2025-07-07
Attending: INTERNAL MEDICINE
Payer: MEDICARE

## 2025-07-07 PROCEDURE — 76536 US EXAM OF HEAD AND NECK: CPT | Mod: 26,,, | Performed by: RADIOLOGY

## 2025-07-07 PROCEDURE — 76536 US EXAM OF HEAD AND NECK: CPT | Mod: TC,PO
